# Patient Record
Sex: FEMALE | Race: WHITE | NOT HISPANIC OR LATINO | URBAN - NONMETROPOLITAN AREA
[De-identification: names, ages, dates, MRNs, and addresses within clinical notes are randomized per-mention and may not be internally consistent; named-entity substitution may affect disease eponyms.]

---

## 2024-08-01 ENCOUNTER — TELEPHONE (OUTPATIENT)
Dept: PRIMARY CARE | Facility: CLINIC | Age: 19
End: 2024-08-01

## 2024-08-01 DIAGNOSIS — Z00.00 HEALTHCARE MAINTENANCE: Primary | ICD-10-CM

## 2024-08-01 NOTE — PROGRESS NOTES
Routine physical lab orders placed, pt can have done anytime up to 3 d before visit     (Blood count, sugar/liver/kidneys, cholesterol)     Fast min 10 h before labs

## 2024-08-01 NOTE — TELEPHONE ENCOUNTER
Patient has a physical with you in October, she was hoping to not wait that long for lab work. She is asking for lab orders to be placed so that she can come in before and get those done.

## 2024-08-23 NOTE — TELEPHONE ENCOUNTER
"Patient left voicemail asking for a \"complete women's hormone panel\" and a celiac blood test to be added to her orders  "

## 2024-10-09 PROBLEM — Z00.00 HEALTHCARE MAINTENANCE: Status: ACTIVE | Noted: 2024-10-09

## 2024-10-09 NOTE — PROGRESS NOTES
Subjective   Patient ID: Cinthia Junior is a 19 y.o. female who presents for Gynecologic Exam (Pt states that she had a transvaginal US done at a GYN, would like a PAP done- is not going to go back to her GYN, thinks she may have endometriosis -- DEFERRED TODAY AS PATIENT SELF PAY AND THIS WOULD BE COST PROHIBITIVE), Flu Vaccine (Pt declines flu vaccine at this time.  ), and gluten intolerance (Pt states that she thinks that she may have a gluten intolerance- is not sure of the exact start date of this- has had this for years- a lot of bloating, spots on the backs of her arms).    HPI     Patient presents today for discussion only visit.    Patient is self-pay - deferred physical, pap, and labs as these would be cost prohibitive, [facility]. Patient verbalizes understanding.    Patient concerns:    # Discuss possible food allergies  Thinks possible gluten intolerance  Gets rashes, such as red ring around mouth  Acne, both cystic and pustular to face  KP bumps to arms/buttocks  Also some bloating  Sx ongoing for greater than 1 year  Has tried low fodmap diet  Gluten elimination did help some with facial acne but not ring around her mouth  Also tried to eliminate both dairy and gluten, became overwhelming.    # Overall screening info  Working FT as nursing assistant  Lives with girlfriend, has been with her for 3 years    Patient declines influenza vaccine.    # Possible Endometriosis    Cervical cancer screening: due - deferred today as patient self-pay, will go to low cost clinic to have this done.  Denies family history in first degree relative.  Denies pelvic pain or vaginal bleeding.  Denies abnormal discharge, reports has some around time of menses that is typical for her.  Female partner x 3 years, did have male partners prior.  Deferred screening for STI and pap as patient self pay.    Wonders if possible endometriosis  Did not like prior  "GYN  Does not want hormone options    Breast cancer screening: n/a  Denies family history in first degree relative.  Denies lumps/bumps, skin changes, nipple retraction, or nipple drainage.    Colon cancer screening: n/a  Denies family history in first degree relative.  Denies melena, hematochezia, constipation, diarrhea, bloating, change in bowel habits.      Review of Systems   All other systems reviewed and are negative.      Objective   /58 (BP Location: Left arm, Patient Position: Sitting, BP Cuff Size: Adult)   Pulse 82   Temp 36.8 °C (98.2 °F) (Temporal)   Ht 1.626 m (5' 4\")   Wt 55 kg (121 lb 3.2 oz)   LMP 09/30/2024 (Exact Date)   SpO2 98%   BMI 20.80 kg/m²     Physical Exam  Vitals and nursing note reviewed.   Constitutional:       General: She is not in acute distress.     Appearance: Normal appearance. She is not toxic-appearing.   HENT:      Head: Normocephalic and atraumatic.   Neurological:      Mental Status: She is alert.   Psychiatric:         Mood and Affect: Mood normal.         Behavior: Behavior normal.         Thought Content: Thought content normal.         Judgment: Judgment normal.         Assessment/Plan   Problem List Items Addressed This Visit             ICD-10-CM    Healthcare maintenance Z00.00     Discussion only visit today - CPE deferred today as patient is self-pay, she is aware [facility], she verbalizes understanding.    Minimum labs ordered today.    PAP and STI deferred today, plans to go to low cost clinic to have this done.    Diet and exercise recommendations revisited.     LIFESTYLE MEASURES  -consider increasing protein intake provided no issues with kidneys to 1 gram per 1 pound of ideal body weight per not to exceed 150 gram per day. May have to supplement with a protein powder to achieve this goal.  -make sure you are avoiding refined carbs such as breads, pasta, cereal, candy, soda,  nutrition bars, granola, " chips, and sugar sweetened beverages.      -eat 5- 7 servings daily of veggies,  healthy protein such as chicken, fish,  beans, and eggs, and include healthy fats in your diet such as seeds, nuts, olive oil, avocados, and salmon.   -exercise 4 - 6 days per week as you are able, 150 minutes total weekly divided up is recommended with 3-4 of those days including resistance/strength training.  -Vitamin D is recommended at 1000 - 5000 IU international units daily.   -Always wear sunscreen when you have sun exposure.  -64 oz of water is recommended daily.  -Dental visits recommended every 6 months.  -Eye exam recommended every 2 years, for those with vision problems every year.           Relevant Orders    CBC    Comprehensive Metabolic Panel    Keratosis pilaris, acquired L85.8     When in shower use panoxyl (benzoyl peroxide) wash, leave on areas for 5 minutes then rinse well. Be careful as this can bleach towels/clothes if not rinsed completely.    After the shower, on clean skin apply glycolic acid toner (can get one fairly cheap by [employer]) followed by Amlactin lotion (also available at drug store).          Perioral dermatitis L71.0     Prescription for metronidazole gel placed today.    Be sure to wash your face AFTER brushing your teeth as some ingredients in toothpaste such as SLS can trigger this.         Food intolerance in adult K90.49     Suspects possible food intolerances to gluten, dairy, etc.    Deferred extensive lab evaluation today as patient is self-pay, she may opt to have further evaluation with physician at low cost clinic. Will get minimum labs, including TTG, as requested by patient.    Recommend elimination diet, patient reports difficult sustainability.  She is aware of low FODMAP diet, has resources for this as well.    For overall gut health I recommend:    -Consider adding in a Probiotic w/ a Prebiotic. I recommend probiotic with 2 different strains, such as Bifidobacterium and  Lactobacillus.  These can be found online or over the counter or [employer].  Capsules/Tables that are able to be refrigerated have the best data for gut health.    -Consider elimination diet with common inflammatories such as dairy, gluten, and sugar. I recommend you cut out one at the time for 1 month period and monitory symptoms. Can choose to reintroduce after that period and see how you respond.          Relevant Orders    Celiac Panel     Other Visit Diagnoses         Codes    Health education/counseling    -  Primary Z71.9            Follow-up deferred at this time, patient self-pay, to consider getting provider at low-cost clinic. She verbalizes understanding.    I will be happy to see patient back if she gets insurance.      Scribe Attestation  By signing my name below, I, Violeta Vera   attest that this documentation has been prepared under the direction and in the presence of Erin Devlin DO.

## 2024-10-10 ENCOUNTER — APPOINTMENT (OUTPATIENT)
Dept: PRIMARY CARE | Facility: CLINIC | Age: 19
End: 2024-10-10

## 2024-10-10 VITALS
WEIGHT: 121.2 LBS | DIASTOLIC BLOOD PRESSURE: 58 MMHG | HEIGHT: 64 IN | OXYGEN SATURATION: 98 % | SYSTOLIC BLOOD PRESSURE: 101 MMHG | HEART RATE: 82 BPM | TEMPERATURE: 98.2 F | BODY MASS INDEX: 20.69 KG/M2

## 2024-10-10 DIAGNOSIS — L85.8 KERATOSIS PILARIS, ACQUIRED: ICD-10-CM

## 2024-10-10 DIAGNOSIS — Z00.00 HEALTHCARE MAINTENANCE: ICD-10-CM

## 2024-10-10 DIAGNOSIS — Z71.9 HEALTH EDUCATION/COUNSELING: Primary | ICD-10-CM

## 2024-10-10 DIAGNOSIS — K90.49 FOOD INTOLERANCE IN ADULT: ICD-10-CM

## 2024-10-10 DIAGNOSIS — L71.0 PERIORAL DERMATITIS: ICD-10-CM

## 2024-10-10 PROBLEM — F41.8 MIXED ANXIETY DEPRESSIVE DISORDER: Status: ACTIVE | Noted: 2021-07-01

## 2024-10-10 PROBLEM — L30.9 ECZEMA: Status: ACTIVE | Noted: 2024-10-10

## 2024-10-10 PROBLEM — R46.81 OBSESSIVE-COMPULSIVE BEHAVIOR: Status: ACTIVE | Noted: 2024-10-10

## 2024-10-10 PROCEDURE — 1036F TOBACCO NON-USER: CPT | Performed by: FAMILY MEDICINE

## 2024-10-10 PROCEDURE — 3008F BODY MASS INDEX DOCD: CPT | Performed by: FAMILY MEDICINE

## 2024-10-10 PROCEDURE — 99212 OFFICE O/P EST SF 10 MIN: CPT | Performed by: FAMILY MEDICINE

## 2024-10-10 RX ORDER — METRONIDAZOLE 7.5 MG/G
CREAM TOPICAL 2 TIMES DAILY
Qty: 45 G | Refills: 2 | Status: SHIPPED | OUTPATIENT
Start: 2024-10-10 | End: 2025-10-10

## 2024-10-10 NOTE — ASSESSMENT & PLAN NOTE
Prescription for metronidazole gel placed today. Can use [facility] if cost prohibitive.    Be sure to wash your face AFTER brushing your teeth as some ingredients in toothpaste such as SLS can trigger this.

## 2024-10-10 NOTE — ASSESSMENT & PLAN NOTE
When in shower use panoxyl (benzoyl peroxide) wash, leave on areas for 5 minutes then rinse well. Be careful as this can bleach towels/clothes if not rinsed completely.    After the shower, on clean skin apply glycolic acid toner (can get one fairly cheap by [employer]) followed by Amlactin lotion (also available at drug store).

## 2024-10-10 NOTE — PATIENT INSTRUCTIONS
www.[email or web address] for low fodmap diet resources    Healthcare maintenance  Discussion only visit today - CPE deferred today as patient is self-pay, she is [facility], she verbalizes understanding.    Minimum labs ordered today.    PAP and STI deferred today, plans to go to low cost clinic to have this done.    Diet and exercise recommendations revisited.     LIFESTYLE MEASURES  -consider increasing protein intake provided no issues with kidneys to 1 gram per 1 pound of ideal body weight per not to exceed 150 gram per day. May have to supplement with a protein powder to achieve this goal.  -make sure you are avoiding refined carbs such as breads, pasta, cereal, candy, soda,  nutrition bars, granola, chips, and sugar sweetened beverages.      -eat 5- 7 servings daily of veggies,  healthy protein such as chicken, fish,  beans, and eggs, and include healthy fats in your diet such as seeds, nuts, olive oil, avocados, and salmon.   -exercise 4 - 6 days per week as you are able, 150 minutes total weekly divided up is recommended with 3-4 of those days including resistance/strength training.  -Vitamin D is recommended at 1000 - 5000 IU international units daily.   -Always wear sunscreen when you have sun exposure.  -64 oz of water is recommended daily.  -Dental visits recommended every 6 months.  -Eye exam recommended every 2 years, for those with vision problems every year.      Keratosis pilaris, acquired  When in shower use panoxyl (benzoyl peroxide) wash, leave on areas for 5 minutes then rinse well. Be careful as this can bleach towels/clothes if not rinsed completely.    After the shower, on clean skin apply glycolic acid toner (can get one fairly cheap by [employer]) followed by Amlactin lotion (also available at drug store).     Perioral dermatitis  Prescription for metronidazole gel placed today. Can use [facility] if cost prohibitive.    Be sure to wash  your face AFTER brushing your teeth as some ingredients in toothpaste such as SLS can trigger this.    Food intolerance in adult  Suspects possible food intolerances to gluten, dairy, etc.    Deferred extensive lab evaluation today as patient is self-pay, she may opt to have further evaluation with physician at low cost clinic. Will get minimum labs, including TTG, as requested by patient.    Recommend elimination diet, patient reports difficult sustainability.  She is aware of low FODMAP diet, has resources for this as well.    For overall gut health I recommend:    -Consider adding in a Probiotic w/ a Prebiotic. I recommend probiotic with 2 different strains, such as Bifidobacterium and Lactobacillus.  These can be found online or over the counter or [employer].  Capsules/Tables that are able to be refrigerated have the best data for gut health.    -Consider elimination diet with common inflammatories such as dairy, gluten, and sugar. I recommend you cut out one at the time for 1 month period and monitory symptoms. Can choose to reintroduce after that period and see how you respond.

## 2024-10-10 NOTE — ASSESSMENT & PLAN NOTE
Discussion only visit today - CPE deferred today as patient is self-pay, she is [facility], she verbalizes understanding.    Minimum labs ordered today.    PAP and STI deferred today, plans to go to low cost clinic to have this done.    Diet and exercise recommendations revisited.     LIFESTYLE MEASURES  -consider increasing protein intake provided no issues with kidneys to 1 gram per 1 pound of ideal body weight per not to exceed 150 gram per day. May have to supplement with a protein powder to achieve this goal.  -make sure you are avoiding refined carbs such as breads, pasta, cereal, candy, soda,  nutrition bars, granola, chips, and sugar sweetened beverages.      -eat 5- 7 servings daily of veggies,  healthy protein such as chicken, fish,  beans, and eggs, and include healthy fats in your diet such as seeds, nuts, olive oil, avocados, and salmon.   -exercise 4 - 6 days per week as you are able, 150 minutes total weekly divided up is recommended with 3-4 of those days including resistance/strength training.  -Vitamin D is recommended at 1000 - 5000 IU international units daily.   -Always wear sunscreen when you have sun exposure.  -64 oz of water is recommended daily.  -Dental visits recommended every 6 months.  -Eye exam recommended every 2 years, for those with vision problems every year.

## 2024-10-10 NOTE — ASSESSMENT & PLAN NOTE
Suspects possible food intolerances to gluten, dairy, etc.    Deferred extensive lab evaluation today as patient is self-pay, she may opt to have further evaluation with physician at low cost clinic. Will get minimum labs, including TTG, as requested by patient.    Recommend elimination diet, patient reports difficult sustainability.  She is aware of low FODMAP diet, has resources for this as well.    For overall gut health I recommend:    -Consider adding in a Probiotic w/ a Prebiotic. I recommend probiotic with 2 different strains, such as Bifidobacterium and Lactobacillus.  These can be found online or over the counter or [employer].  Capsules/Tables that are able to be refrigerated have the best data for gut health.    -Consider elimination diet with common inflammatories such as dairy, gluten, and sugar. I recommend you cut out one at the time for 1 month period and monitory symptoms. Can choose to reintroduce after that period and see how you respond.

## 2025-01-08 ENCOUNTER — PATIENT MESSAGE (OUTPATIENT)
Dept: PRIMARY CARE | Facility: CLINIC | Age: 20
End: 2025-01-08

## 2025-01-08 DIAGNOSIS — K90.49 FOOD INTOLERANCE IN ADULT: Primary | ICD-10-CM

## 2025-01-08 DIAGNOSIS — R14.0 ABDOMINAL BLOATING: ICD-10-CM

## 2025-01-08 DIAGNOSIS — R76.8 IGG GLIADIN ANTIBODY POSITIVE: ICD-10-CM
